# Patient Record
Sex: MALE | Race: WHITE | Employment: OTHER | URBAN - METROPOLITAN AREA
[De-identification: names, ages, dates, MRNs, and addresses within clinical notes are randomized per-mention and may not be internally consistent; named-entity substitution may affect disease eponyms.]

---

## 2021-09-21 ENCOUNTER — HOSPITAL ENCOUNTER (OUTPATIENT)
Dept: LAB | Age: 61
Discharge: HOME | End: 2021-09-21

## 2021-09-21 PROCEDURE — 88305 TISSUE EXAM BY PATHOLOGIST: CPT

## 2022-08-02 ENCOUNTER — OFFICE VISIT (OUTPATIENT)
Dept: INTERNAL MEDICINE CLINIC | Facility: CLINIC | Age: 62
End: 2022-08-02
Payer: COMMERCIAL

## 2022-08-02 VITALS
SYSTOLIC BLOOD PRESSURE: 150 MMHG | HEART RATE: 47 BPM | BODY MASS INDEX: 26.77 KG/M2 | OXYGEN SATURATION: 98 % | RESPIRATION RATE: 18 BRPM | DIASTOLIC BLOOD PRESSURE: 81 MMHG | TEMPERATURE: 97 F | HEIGHT: 70 IN | WEIGHT: 187 LBS

## 2022-08-02 DIAGNOSIS — Z00.00 ROUTINE GENERAL MEDICAL EXAMINATION AT A HEALTH CARE FACILITY: ICD-10-CM

## 2022-08-02 DIAGNOSIS — Z80.42 FAMILY HISTORY OF PROSTATE CANCER: Primary | ICD-10-CM

## 2022-08-02 DIAGNOSIS — D12.5 ADENOMATOUS POLYP OF SIGMOID COLON: ICD-10-CM

## 2022-08-02 DIAGNOSIS — R42 VERTIGO: ICD-10-CM

## 2022-08-02 DIAGNOSIS — R53.83 FATIGUE, UNSPECIFIED TYPE: ICD-10-CM

## 2022-08-02 DIAGNOSIS — E78.49 OTHER HYPERLIPIDEMIA: ICD-10-CM

## 2022-08-02 LAB
ALBUMIN SERPL-MCNC: 4.5 G/DL (ref 3.2–4.6)
ALBUMIN/GLOB SERPL: 1.4 {RATIO} (ref 1.2–3.5)
ALP SERPL-CCNC: 71 U/L (ref 50–136)
ALT SERPL-CCNC: 29 U/L (ref 12–65)
ANION GAP SERPL CALC-SCNC: 5 MMOL/L (ref 7–16)
AST SERPL-CCNC: 14 U/L (ref 15–37)
BASOPHILS # BLD: 0 K/UL (ref 0–0.2)
BASOPHILS NFR BLD: 1 % (ref 0–2)
BILIRUB SERPL-MCNC: 0.9 MG/DL (ref 0.2–1.1)
BUN SERPL-MCNC: 20 MG/DL (ref 8–23)
CALCIUM SERPL-MCNC: 10.1 MG/DL (ref 8.3–10.4)
CHLORIDE SERPL-SCNC: 109 MMOL/L (ref 98–107)
CHOLEST SERPL-MCNC: 198 MG/DL
CO2 SERPL-SCNC: 26 MMOL/L (ref 21–32)
CREAT SERPL-MCNC: 1.1 MG/DL (ref 0.8–1.5)
DIFFERENTIAL METHOD BLD: ABNORMAL
EOSINOPHIL # BLD: 0.1 K/UL (ref 0–0.8)
EOSINOPHIL NFR BLD: 2 % (ref 0.5–7.8)
ERYTHROCYTE [DISTWIDTH] IN BLOOD BY AUTOMATED COUNT: 13.2 % (ref 11.9–14.6)
GLOBULIN SER CALC-MCNC: 3.3 G/DL (ref 2.3–3.5)
GLUCOSE SERPL-MCNC: 103 MG/DL (ref 65–100)
HCT VFR BLD AUTO: 51.8 % (ref 41.1–50.3)
HDLC SERPL-MCNC: 41 MG/DL (ref 40–60)
HDLC SERPL: 4.8 {RATIO}
HGB BLD-MCNC: 16.4 G/DL (ref 13.6–17.2)
IMM GRANULOCYTES # BLD AUTO: 0 K/UL (ref 0–0.5)
IMM GRANULOCYTES NFR BLD AUTO: 1 % (ref 0–5)
LDLC SERPL CALC-MCNC: 143.8 MG/DL
LYMPHOCYTES # BLD: 1.5 K/UL (ref 0.5–4.6)
LYMPHOCYTES NFR BLD: 23 % (ref 13–44)
MCH RBC QN AUTO: 29.9 PG (ref 26.1–32.9)
MCHC RBC AUTO-ENTMCNC: 31.7 G/DL (ref 31.4–35)
MCV RBC AUTO: 94.5 FL (ref 79.6–97.8)
MONOCYTES # BLD: 0.5 K/UL (ref 0.1–1.3)
MONOCYTES NFR BLD: 8 % (ref 4–12)
NEUTS SEG # BLD: 4.3 K/UL (ref 1.7–8.2)
NEUTS SEG NFR BLD: 65 % (ref 43–78)
NRBC # BLD: 0 K/UL (ref 0–0.2)
PLATELET # BLD AUTO: 312 K/UL (ref 150–450)
PMV BLD AUTO: 10.6 FL (ref 9.4–12.3)
POTASSIUM SERPL-SCNC: 5.4 MMOL/L (ref 3.5–5.1)
PROT SERPL-MCNC: 7.8 G/DL (ref 6.3–8.2)
PSA SERPL-MCNC: 3.1 NG/ML
RBC # BLD AUTO: 5.48 M/UL (ref 4.23–5.6)
SODIUM SERPL-SCNC: 140 MMOL/L (ref 136–145)
TRIGL SERPL-MCNC: 66 MG/DL (ref 35–150)
VLDLC SERPL CALC-MCNC: 13.2 MG/DL (ref 6–23)
WBC # BLD AUTO: 6.5 K/UL (ref 4.3–11.1)

## 2022-08-02 PROCEDURE — 99396 PREV VISIT EST AGE 40-64: CPT | Performed by: INTERNAL MEDICINE

## 2022-08-02 RX ORDER — MECLIZINE HYDROCHLORIDE 25 MG/1
25 TABLET ORAL 3 TIMES DAILY PRN
Qty: 15 TABLET | Refills: 0 | Status: SHIPPED | OUTPATIENT
Start: 2022-08-02 | End: 2022-08-12

## 2022-08-02 SDOH — ECONOMIC STABILITY: FOOD INSECURITY: WITHIN THE PAST 12 MONTHS, YOU WORRIED THAT YOUR FOOD WOULD RUN OUT BEFORE YOU GOT MONEY TO BUY MORE.: NEVER TRUE

## 2022-08-02 SDOH — ECONOMIC STABILITY: FOOD INSECURITY: WITHIN THE PAST 12 MONTHS, THE FOOD YOU BOUGHT JUST DIDN'T LAST AND YOU DIDN'T HAVE MONEY TO GET MORE.: NEVER TRUE

## 2022-08-02 ASSESSMENT — PATIENT HEALTH QUESTIONNAIRE - PHQ9
2. FEELING DOWN, DEPRESSED OR HOPELESS: 0
SUM OF ALL RESPONSES TO PHQ9 QUESTIONS 1 & 2: 0
SUM OF ALL RESPONSES TO PHQ QUESTIONS 1-9: 0
1. LITTLE INTEREST OR PLEASURE IN DOING THINGS: 0

## 2022-08-02 ASSESSMENT — ENCOUNTER SYMPTOMS
ALLERGIC/IMMUNOLOGIC NEGATIVE: 1
EYES NEGATIVE: 1
RESPIRATORY NEGATIVE: 1
GASTROINTESTINAL NEGATIVE: 1

## 2022-08-02 ASSESSMENT — SOCIAL DETERMINANTS OF HEALTH (SDOH): HOW HARD IS IT FOR YOU TO PAY FOR THE VERY BASICS LIKE FOOD, HOUSING, MEDICAL CARE, AND HEATING?: NOT HARD AT ALL

## 2022-08-02 NOTE — PROGRESS NOTES
8/2/2022 9:22 AM  Location:[facility] [address] INTERNAL MEDICINE  [place]  Patient #:  [id number]  YOB: 1960          YOUR LAST HEMOGLOBIN A1CS:   No results found for: HBA1C, [id number]    YOUR LAST LIPID PROFILE:   Lab Results   Component Value Date/Time    CHOL 162 07/30/2021 10:00 AM    HDL 37 07/30/2021 10:00 AM    VLDL 14 07/30/2021 10:00 AM         Lab Results   Component Value Date/Time    GFRAA 89 07/30/2021 10:00 AM    BUN 13 07/30/2021 10:00 AM     07/30/2021 10:00 AM    K 4.8 07/30/2021 10:00 AM     07/30/2021 10:00 AM    CO2 22 07/30/2021 10:00 AM           History of Present Illness     Chief Complaint   Patient presents with    Annual Exam     Ype  90 % protein diet , eats no vegetable, takes vitamin supplements  Noted dizziness on way to [place] in am x2 and nausea  History of syncope with negative workup 14 yrs ago, saw neurology etc, possibly a seizure     This patient presents for the annual preventive exam. Patient denies any problems other than what is noted  In ROS   He has had 2 episodes of vertigo when arising from the bed to go to the bathroom. He says one was severe causing severe nausea. He relates a history of syncope approximately 14 years ago and at that time was seen by neurology and had a complete work-up that was negative. There was some consideration for some type of seizure at that time and he is on almost complete protein diet avoiding vegetables fruits. He says he feels much better since this was started. He does take supplements to provide vitamins not included in his diet    Mr. Yadi Green is a 60 y.o. male  who presents for annual preventive visit      Allergies   Allergen Reactions    Iron Other (See Comments)    Sulfa Antibiotics Other (See Comments)    Zinc Other (See Comments)     Past Medical History:   Diagnosis Date    Colon polyps 2017    Hematospermia     Irregular heartbeat 1975    Kidney stones 2002    Pneumonia 1977    had double pneumonia    Seizure [facility] 2003     Social History     Socioeconomic History    Marital status: Single     Spouse name: None    Number of children: None    Years of education: None    Highest education level: None   Tobacco Use    Smoking status: Never    Smokeless tobacco: Never   Substance and Sexual Activity    Drug use: Never     Social Determinants of Health     Financial Resource Strain: Low Risk     Difficulty of Paying Living Expenses: Not hard at all   Food Insecurity: No Food Insecurity    Worried About Running Out of Food in the Last Year: Never true    Ran Out of Food in the Last Year: Never true     Past Surgical History:   Procedure Laterality Date    HERNIA REPAIR  1961    HERNIA REPAIR       Current Outpatient Medications   Medication Sig Dispense Refill    meclizine (ANTIVERT) 25 MG tablet Take 1 tablet by mouth 3 times daily as needed for Dizziness 15 tablet 0    ascorbic acid (VITAMIN C) 500 MG tablet Take 500 mg by mouth daily      vitamin D3 (CHOLECALCIFEROL) 125 MCG (5000 UT) TABS tablet Take 5,000 Units by mouth daily      vitamin D 25 MCG (1000 UT) CAPS Take by mouth daily      cyanocobalamin 2500 MCG SUBL Take 2,500 mcg by mouth daily      pyridoxine (B-6) 100 MG tablet Take 100 mg by mouth daily       No current facility-administered medications for this visit.      Health Maintenance   Topic Date Due    COVID-19 Vaccine (1) Never done    HIV screen  Never done    Hepatitis C screen  Never done    DTaP/Tdap/Td vaccine (1 - Tdap) Never done    Shingles vaccine (2 of 2) 03/29/2021    Depression Screen  07/30/2022    Prostate Specific Antigen (PSA) Screening or Monitoring  07/30/2022    Flu vaccine (1) 09/01/2022    Lipids  07/30/2026    Colorectal Cancer Screen  09/21/2026    Hepatitis A vaccine  Aged Out    Hepatitis B vaccine  Aged Out    Hib vaccine  Aged Out    Meningococcal (ACWY) vaccine  Aged Out    Pneumococcal 0-64 years Vaccine Aged Out     Family History   Problem Relation Age of Onset    Cancer Father     Osteoarthritis Mother     Cancer Mother         breast    Hypertension Mother     Alzheimer's Disease Father     Kidney Disease Brother     Heart Disease Brother     Diabetes Brother              Review of Systems  Review of Systems   Constitutional:  Negative for fever. HENT: Negative. Eyes: Negative. Respiratory: Negative. Cardiovascular: Negative. Gastrointestinal: Negative. Endocrine: Negative. Genitourinary: Negative. Musculoskeletal: Negative. Skin: Negative. Allergic/Immunologic: Negative. Neurological: Negative. Hematological: Negative. Psychiatric/Behavioral: Negative. All other systems reviewed and are negative. BP (!) 150/81 (Site: Left Upper Arm, Position: Sitting, Cuff Size: Large Adult)   Pulse (!) 47   Temp 97 °F (36.1 °C) (Temporal)   Resp 18   Ht 5' 10\" (1.778 m)   Wt 187 lb (84.8 kg)   SpO2 98% Comment: ra  BMI 26.83 kg/m²       Physical Exam    Physical Exam  Constitutional:       General: He is not in acute distress. Appearance: Normal appearance. He is not ill-appearing. HENT:      Head: Normocephalic and atraumatic. Right Ear: Tympanic membrane and ear canal normal.      Left Ear: Tympanic membrane and ear canal normal.      Nose: Nose normal.      Mouth/Throat:      Mouth: Mucous membranes are dry. Pharynx: Oropharynx is clear. Eyes:      Extraocular Movements: Extraocular movements intact. Conjunctiva/sclera: Conjunctivae normal.      Pupils: Pupils are equal, round, and reactive to light. Cardiovascular:      Rate and Rhythm: Normal rate and regular rhythm. Pulses: Normal pulses. Heart sounds: Normal heart sounds. Pulmonary:      Effort: Pulmonary effort is normal.      Breath sounds: Normal breath sounds. Abdominal:      General: Abdomen is flat. Bowel sounds are normal. There is no distension.       Palpations: Abdomen is soft. There is no mass. Tenderness: There is no abdominal tenderness. There is no rebound. Hernia: No hernia is present. Musculoskeletal:         General: Normal range of motion. Skin:     General: Skin is warm. Neurological:      General: No focal deficit present. Mental Status: He is alert and oriented to person, place, and time. Motor: No weakness. Psychiatric:         Mood and Affect: Mood normal.         Behavior: Behavior normal.         Thought Content: Thought content normal.         Judgment: Judgment normal.       Assessment & Plan    Encounter Diagnoses   Name Primary?  Family history of prostate cancer Yes    Fatigue, unspecified type     Adenomatous polyp of sigmoid colon     Vertigo     Other hyperlipidemia        Current Outpatient Medications   Medication Sig Dispense Refill    meclizine (ANTIVERT) 25 MG tablet Take 1 tablet by mouth 3 times daily as needed for Dizziness 15 tablet 0    ascorbic acid (VITAMIN C) 500 MG tablet Take 500 mg by mouth daily      vitamin D3 (CHOLECALCIFEROL) 125 MCG (5000 UT) TABS tablet Take 5,000 Units by mouth daily      vitamin D 25 MCG (1000 UT) CAPS Take by mouth daily      cyanocobalamin 2500 MCG SUBL Take 2,500 mcg by mouth daily      pyridoxine (B-6) 100 MG tablet Take 100 mg by mouth daily       No current facility-administered medications for this visit.        Orders Placed This Encounter   Procedures    Comprehensive Metabolic Panel     Standing Status:   Future     Standing Expiration Date:   8/2/2023    CBC with Auto Differential     Standing Status:   Future     Standing Expiration Date:   8/2/2023    Lipid Panel     Standing Status:   Future     Standing Expiration Date:   8/2/2023    PSA, Diagnostic     Standing Status:   Future     Standing Expiration Date:   8/2/2023       Medications Discontinued During This Encounter   Medication Reason    naproxen (NAPROSYN) 500 MG tablet Ineffective    cyclobenzaprine (FLEXERIL) 10 MG tablet Ineffective       1. Family history of prostate cancer     - PSA, Diagnostic; Future    2. Fatigue, unspecified type     - Comprehensive Metabolic Panel; Future  - CBC with Auto Differential; Future    3. Adenomatous polyp of sigmoid colon  Up-to-date with colonoscopy    4. Vertigo  Is given some as needed meclizine and if this recurs or becomes more frequent we will refer to ENT  - meclizine (ANTIVERT) 25 MG tablet; Take 1 tablet by mouth 3 times daily as needed for Dizziness  Dispense: 15 tablet; Refill: 0    5. Other hyperlipidemia     - Lipid Panel; Future      No follow-up provider specified.         Aneta Payne MD

## 2023-03-25 SDOH — ECONOMIC STABILITY: TRANSPORTATION INSECURITY
IN THE PAST 12 MONTHS, HAS LACK OF TRANSPORTATION KEPT YOU FROM MEETINGS, WORK, OR FROM GETTING THINGS NEEDED FOR DAILY LIVING?: NO

## 2023-03-25 SDOH — ECONOMIC STABILITY: FOOD INSECURITY: WITHIN THE PAST 12 MONTHS, YOU WORRIED THAT YOUR FOOD WOULD RUN OUT BEFORE YOU GOT MONEY TO BUY MORE.: PATIENT DECLINED

## 2023-03-25 SDOH — ECONOMIC STABILITY: HOUSING INSECURITY
IN THE LAST 12 MONTHS, WAS THERE A TIME WHEN YOU DID NOT HAVE A STEADY PLACE TO SLEEP OR SLEPT IN A SHELTER (INCLUDING NOW)?: NO

## 2023-03-25 SDOH — ECONOMIC STABILITY: INCOME INSECURITY: HOW HARD IS IT FOR YOU TO PAY FOR THE VERY BASICS LIKE FOOD, HOUSING, MEDICAL CARE, AND HEATING?: PATIENT DECLINED

## 2023-03-25 SDOH — ECONOMIC STABILITY: FOOD INSECURITY: WITHIN THE PAST 12 MONTHS, THE FOOD YOU BOUGHT JUST DIDN'T LAST AND YOU DIDN'T HAVE MONEY TO GET MORE.: PATIENT DECLINED

## 2023-03-27 ENCOUNTER — OFFICE VISIT (OUTPATIENT)
Dept: INTERNAL MEDICINE CLINIC | Facility: CLINIC | Age: 63
End: 2023-03-27
Payer: COMMERCIAL

## 2023-03-27 VITALS
TEMPERATURE: 97.2 F | BODY MASS INDEX: 28.26 KG/M2 | OXYGEN SATURATION: 97 % | SYSTOLIC BLOOD PRESSURE: 155 MMHG | HEIGHT: 70 IN | DIASTOLIC BLOOD PRESSURE: 76 MMHG | RESPIRATION RATE: 18 BRPM | HEART RATE: 54 BPM | WEIGHT: 197.4 LBS

## 2023-03-27 DIAGNOSIS — D17.1 LIPOMA OF TORSO: Primary | ICD-10-CM

## 2023-03-27 DIAGNOSIS — R03.0 ELEVATED BLOOD PRESSURE READING WITHOUT DIAGNOSIS OF HYPERTENSION: ICD-10-CM

## 2023-03-27 DIAGNOSIS — R25.2 MUSCLE CRAMPING: ICD-10-CM

## 2023-03-27 LAB
ANION GAP SERPL CALC-SCNC: 3 MMOL/L (ref 2–11)
BASOPHILS # BLD: 0 K/UL (ref 0–0.2)
BASOPHILS NFR BLD: 0 % (ref 0–2)
BUN SERPL-MCNC: 18 MG/DL (ref 8–23)
CALCIUM SERPL-MCNC: 10 MG/DL (ref 8.3–10.4)
CHLORIDE SERPL-SCNC: 107 MMOL/L (ref 101–110)
CO2 SERPL-SCNC: 27 MMOL/L (ref 21–32)
CREAT SERPL-MCNC: 1 MG/DL (ref 0.8–1.5)
DIFFERENTIAL METHOD BLD: NORMAL
EOSINOPHIL # BLD: 0.1 K/UL (ref 0–0.8)
EOSINOPHIL NFR BLD: 2 % (ref 0.5–7.8)
ERYTHROCYTE [DISTWIDTH] IN BLOOD BY AUTOMATED COUNT: 13.5 % (ref 11.9–14.6)
FERRITIN SERPL-MCNC: 163 NG/ML (ref 8–388)
GLUCOSE SERPL-MCNC: 108 MG/DL (ref 65–100)
HCT VFR BLD AUTO: 47.7 % (ref 41.1–50.3)
HGB BLD-MCNC: 15.8 G/DL (ref 13.6–17.2)
IMM GRANULOCYTES # BLD AUTO: 0.1 K/UL (ref 0–0.5)
IMM GRANULOCYTES NFR BLD AUTO: 1 % (ref 0–5)
LYMPHOCYTES # BLD: 1.4 K/UL (ref 0.5–4.6)
LYMPHOCYTES NFR BLD: 19 % (ref 13–44)
MAGNESIUM SERPL-MCNC: 2.2 MG/DL (ref 1.8–2.4)
MCH RBC QN AUTO: 29.4 PG (ref 26.1–32.9)
MCHC RBC AUTO-ENTMCNC: 33.1 G/DL (ref 31.4–35)
MCV RBC AUTO: 88.8 FL (ref 82–102)
MONOCYTES # BLD: 0.7 K/UL (ref 0.1–1.3)
MONOCYTES NFR BLD: 10 % (ref 4–12)
NEUTS SEG # BLD: 5 K/UL (ref 1.7–8.2)
NEUTS SEG NFR BLD: 68 % (ref 43–78)
NRBC # BLD: 0 K/UL (ref 0–0.2)
PLATELET # BLD AUTO: 313 K/UL (ref 150–450)
PMV BLD AUTO: 10.2 FL (ref 9.4–12.3)
POTASSIUM SERPL-SCNC: 4.3 MMOL/L (ref 3.5–5.1)
RBC # BLD AUTO: 5.37 M/UL (ref 4.23–5.6)
SODIUM SERPL-SCNC: 137 MMOL/L (ref 133–143)
WBC # BLD AUTO: 7.4 K/UL (ref 4.3–11.1)

## 2023-03-27 PROCEDURE — 99213 OFFICE O/P EST LOW 20 MIN: CPT | Performed by: NURSE PRACTITIONER

## 2023-03-27 ASSESSMENT — ENCOUNTER SYMPTOMS
CHEST TIGHTNESS: 1
VOMITING: 0
CONSTIPATION: 0
COUGH: 1
ABDOMINAL PAIN: 0
WHEEZING: 0
SHORTNESS OF BREATH: 1
DIARRHEA: 0

## 2023-03-27 ASSESSMENT — PATIENT HEALTH QUESTIONNAIRE - PHQ9
SUM OF ALL RESPONSES TO PHQ QUESTIONS 1-9: 0
SUM OF ALL RESPONSES TO PHQ9 QUESTIONS 1 & 2: 0
2. FEELING DOWN, DEPRESSED OR HOPELESS: 0
SUM OF ALL RESPONSES TO PHQ QUESTIONS 1-9: 0
SUM OF ALL RESPONSES TO PHQ QUESTIONS 1-9: 0
1. LITTLE INTEREST OR PLEASURE IN DOING THINGS: 0
SUM OF ALL RESPONSES TO PHQ QUESTIONS 1-9: 0

## 2023-03-27 NOTE — PROGRESS NOTES
3/27/2023 10:04 AM  Location:[facility] [address] INTERNAL MEDICINE  [place]  Patient #:  [id number]  YOB: 1960          YOUR LAST HEMOGLOBIN A1CS:   No results found for: HBA1C, [id number]    YOUR LAST LIPID PROFILE:   Lab Results   Component Value Date/Time    CHOL 198 08/02/2022 09:48 AM    HDL 41 08/02/2022 09:48 AM    VLDL 14 07/30/2021 10:00 AM         Lab Results   Component Value Date/Time    GFRAA >60 08/02/2022 09:48 AM    BUN 20 08/02/2022 09:48 AM     08/02/2022 09:48 AM    K 5.4 08/02/2022 09:48 AM     08/02/2022 09:48 AM    CO2 26 08/02/2022 09:48 AM           History of Present Illness     Chief Complaint   Patient presents with    Follow-up     Lump on back, and wants to have his blood checked, is fasting        Mr. Jennifer Osorio is a 58 y.o. male  who presents for skin concern, muscle cramping, cough. Mr. Jennifer Osorio presents with noticing a firm palpable area that is not painful to his right back. He has noticed this for about the past month. There is no associated pain, drainage, erythema. He has not had any trauma to the area. He does have a history of rib fractures on the left side. He also reports he thinks he had COVID in January and has had some intermittent spasms in his left upper back, some spasms of his lower back and bilateral leg cramps  since having COVID. He denies leg swelling, weight gain, chest pain. Reports mild intermittent shortness of breath associated with cough and trying to take a deep breath.          Allergies   Allergen Reactions    Iron Other (See Comments)    Sulfa Antibiotics Other (See Comments)    Zinc Other (See Comments)     Past Medical History:   Diagnosis Date    Colon polyps 2017    Hematospermia     Irregular heartbeat 1975    Kidney stones 2002    Pneumonia 1977    had double pneumonia    Seizure ([facility] [address] 2003     Social History     Socioeconomic History    Marital status: Single     Spouse name: None    Number of

## 2023-03-28 ENCOUNTER — TELEPHONE (OUTPATIENT)
Dept: INTERNAL MEDICINE CLINIC | Facility: CLINIC | Age: 63
End: 2023-03-28

## 2023-03-28 DIAGNOSIS — R35.1 NOCTURIA: Primary | ICD-10-CM

## 2023-03-28 NOTE — TELEPHONE ENCOUNTER
Needs PSA scheduled. Below message sent on my chart. Mr. Anatoly Jordan,  Your PSA level is checked on an annual basis unless you are having symptoms. The last time it was checked was in August of 2022. Happy to re-order early if you would like! I went ahead and ordered so you could get this checked. The office will call and schedule. Keep me updated and I hope you are doing well!   Taniya Frankel, [address] [facility]

## 2023-04-14 ENCOUNTER — HOSPITAL ENCOUNTER (OUTPATIENT)
Dept: GENERAL RADIOLOGY | Age: 63
Discharge: HOME | End: 2023-04-17
Payer: COMMERCIAL

## 2023-04-14 ENCOUNTER — HOSPITAL ENCOUNTER (OUTPATIENT)
Dept: ULTRASOUND IMAGING | Age: 63
Discharge: HOME | End: 2023-04-17
Payer: COMMERCIAL

## 2023-04-14 DIAGNOSIS — D17.1 LIPOMA OF TORSO: ICD-10-CM

## 2023-04-14 PROCEDURE — 71101 X-RAY EXAM UNILAT RIBS/CHEST: CPT

## 2023-04-14 PROCEDURE — 76705 ECHO EXAM OF ABDOMEN: CPT

## 2023-10-24 ENCOUNTER — TELEPHONE (OUTPATIENT)
Dept: INTERNAL MEDICINE CLINIC | Facility: CLINIC | Age: 63
End: 2023-10-24

## 2023-10-24 NOTE — TELEPHONE ENCOUNTER
TRIAGE    Mr. Blue Esparza has some problems with his elbow. Has been hurting for two months. He called for an appointment for an injection . Will schedule him with Yvonne.

## 2023-10-30 ENCOUNTER — OFFICE VISIT (OUTPATIENT)
Dept: INTERNAL MEDICINE CLINIC | Facility: CLINIC | Age: 63
End: 2023-10-30
Payer: COMMERCIAL

## 2023-10-30 ENCOUNTER — TELEPHONE (OUTPATIENT)
Dept: INTERNAL MEDICINE CLINIC | Facility: CLINIC | Age: 63
End: 2023-10-30

## 2023-10-30 VITALS
DIASTOLIC BLOOD PRESSURE: 74 MMHG | BODY MASS INDEX: 28.55 KG/M2 | RESPIRATION RATE: 16 BRPM | HEART RATE: 49 BPM | SYSTOLIC BLOOD PRESSURE: 157 MMHG | TEMPERATURE: 97 F | WEIGHT: 199.4 LBS | HEIGHT: 70 IN

## 2023-10-30 DIAGNOSIS — E55.9 VITAMIN D DEFICIENCY: ICD-10-CM

## 2023-10-30 DIAGNOSIS — R53.83 FATIGUE, UNSPECIFIED TYPE: ICD-10-CM

## 2023-10-30 DIAGNOSIS — R03.0 ELEVATED BLOOD PRESSURE READING IN OFFICE WITHOUT DIAGNOSIS OF HYPERTENSION: ICD-10-CM

## 2023-10-30 DIAGNOSIS — Z12.5 SPECIAL SCREENING FOR MALIGNANT NEOPLASM OF PROSTATE: ICD-10-CM

## 2023-10-30 DIAGNOSIS — Z00.00 ROUTINE GENERAL MEDICAL EXAMINATION AT A HEALTH CARE FACILITY: ICD-10-CM

## 2023-10-30 DIAGNOSIS — R00.2 POUNDING HEARTBEAT: ICD-10-CM

## 2023-10-30 DIAGNOSIS — M77.12 LATERAL EPICONDYLITIS OF LEFT ELBOW: Primary | ICD-10-CM

## 2023-10-30 LAB
BASOPHILS # BLD: 0 K/UL (ref 0–0.2)
BASOPHILS NFR BLD: 1 % (ref 0–2)
DIFFERENTIAL METHOD BLD: NORMAL
EOSINOPHIL # BLD: 0.1 K/UL (ref 0–0.8)
EOSINOPHIL NFR BLD: 3 % (ref 0.5–7.8)
ERYTHROCYTE [DISTWIDTH] IN BLOOD BY AUTOMATED COUNT: 13 % (ref 11.9–14.6)
HCT VFR BLD AUTO: 47.7 % (ref 41.1–50.3)
HGB BLD-MCNC: 15.8 G/DL (ref 13.6–17.2)
IMM GRANULOCYTES # BLD AUTO: 0 K/UL (ref 0–0.5)
IMM GRANULOCYTES NFR BLD AUTO: 0 % (ref 0–5)
LYMPHOCYTES # BLD: 1.5 K/UL (ref 0.5–4.6)
LYMPHOCYTES NFR BLD: 26 % (ref 13–44)
MCH RBC QN AUTO: 29.9 PG (ref 26.1–32.9)
MCHC RBC AUTO-ENTMCNC: 33.1 G/DL (ref 31.4–35)
MCV RBC AUTO: 90.2 FL (ref 82–102)
MONOCYTES # BLD: 0.6 K/UL (ref 0.1–1.3)
MONOCYTES NFR BLD: 10 % (ref 4–12)
NEUTS SEG # BLD: 3.4 K/UL (ref 1.7–8.2)
NEUTS SEG NFR BLD: 60 % (ref 43–78)
NRBC # BLD: 0 K/UL (ref 0–0.2)
PLATELET # BLD AUTO: 319 K/UL (ref 150–450)
PMV BLD AUTO: 10.3 FL (ref 9.4–12.3)
RBC # BLD AUTO: 5.29 M/UL (ref 4.23–5.6)
WBC # BLD AUTO: 5.7 K/UL (ref 4.3–11.1)

## 2023-10-30 PROCEDURE — 93000 ELECTROCARDIOGRAM COMPLETE: CPT | Performed by: NURSE PRACTITIONER

## 2023-10-30 PROCEDURE — 20605 DRAIN/INJ JOINT/BURSA W/O US: CPT | Performed by: NURSE PRACTITIONER

## 2023-10-30 PROCEDURE — 99214 OFFICE O/P EST MOD 30 MIN: CPT | Performed by: NURSE PRACTITIONER

## 2023-10-30 RX ORDER — TRIAMCINOLONE ACETONIDE 40 MG/ML
20 INJECTION, SUSPENSION INTRA-ARTICULAR; INTRAMUSCULAR ONCE
Status: COMPLETED | OUTPATIENT
Start: 2023-10-30 | End: 2023-10-30

## 2023-10-30 RX ADMIN — TRIAMCINOLONE ACETONIDE 20 MG: 40 INJECTION, SUSPENSION INTRA-ARTICULAR; INTRAMUSCULAR at 09:47

## 2023-10-30 ASSESSMENT — ENCOUNTER SYMPTOMS: SHORTNESS OF BREATH: 0

## 2023-10-30 NOTE — PROGRESS NOTES
vitamin D3 (CHOLECALCIFEROL) 125 MCG (5000 UT) TABS tablet Take 1 tablet by mouth daily      cyanocobalamin 2500 MCG SUBL Take 1 tablet by mouth daily      pyridoxine (B-6) 100 MG tablet Take 1 tablet by mouth daily       No current facility-administered medications for this visit. No orders of the defined types were placed in this encounter. Medications Discontinued During This Encounter   Medication Reason    vitamin D 25 MCG (1000 UT) CAPS LIST CLEANUP       1. Lateral epicondylitis of left elbow  - DRAIN/INJECT SMALL JOINT/BURSA  Procedure note: After discussion of risks and benefits including, but not limited to pain, infection, steroid flare, increased blood sugar, fat necrosis, skin discoloration, and injury to blood vessels or nerves, the patient verbally consented to proceed with an elbow injection. Procedure:  left  elbow arthrocentesis performed, aseptic technique used, site cleansed with betadine, Triamcinalone 20mg and Lidocaine 1% 1mL injected. With the elbow flexed, 25ga needle was inserted distal to the lateral epicondyle at the point of maximum tenderness. Aspiration of needle to ensure that there was no needle placement in the blood vessel. The solution was injected slowly. Distal n/v intact pre and post-procedure. Pt. tolerated procedure well. Pt. was instructed to apply ice to joints for 15 minutes 3 x daily, and rest the joints. Adivsed pt. to call office with any redness, swelling, warmth, or increased pain to area. Adivsed pt. to call office in next few days if symptoms are not improving. Will call and check on him in several days. Discussed strengthening exercises, provided today, will continue with counter force brace with activity. Refer if pain persists. - triamcinolone acetonide (KENALOG-40) injection 20 mg    2. Special screening for malignant neoplasm of prostate  - PSA Screening; Future  - PSA Screening    3.  Elevated blood pressure reading in office without

## 2023-10-30 NOTE — TELEPHONE ENCOUNTER
Patient was in office wants to know if he needs to keep his physical stated he got all his blood work done today during his visit. He is not on any medications. Wants to know if he needs to come back for appointment in December?

## 2023-10-31 ENCOUNTER — TELEPHONE (OUTPATIENT)
Dept: INTERNAL MEDICINE CLINIC | Facility: CLINIC | Age: 63
End: 2023-10-31

## 2023-10-31 LAB
25(OH)D3 SERPL-MCNC: 96.3 NG/ML (ref 30–100)
ALBUMIN SERPL-MCNC: 4.5 G/DL (ref 3.2–4.6)
ALBUMIN/GLOB SERPL: 1.6 (ref 0.4–1.6)
ALP SERPL-CCNC: 58 U/L (ref 50–136)
ALT SERPL-CCNC: 41 U/L (ref 12–65)
ANION GAP SERPL CALC-SCNC: 7 MMOL/L (ref 2–11)
AST SERPL-CCNC: 20 U/L (ref 15–37)
BILIRUB SERPL-MCNC: 0.8 MG/DL (ref 0.2–1.1)
BUN SERPL-MCNC: 22 MG/DL (ref 8–23)
CALCIUM SERPL-MCNC: 9.6 MG/DL (ref 8.3–10.4)
CHLORIDE SERPL-SCNC: 110 MMOL/L (ref 101–110)
CHOLEST SERPL-MCNC: 203 MG/DL
CO2 SERPL-SCNC: 24 MMOL/L (ref 21–32)
CREAT SERPL-MCNC: 1 MG/DL (ref 0.8–1.5)
GLOBULIN SER CALC-MCNC: 2.8 G/DL (ref 2.8–4.5)
GLUCOSE SERPL-MCNC: 101 MG/DL (ref 65–100)
HDLC SERPL-MCNC: 40 MG/DL (ref 40–60)
HDLC SERPL: 5.1
LDLC SERPL CALC-MCNC: 147.2 MG/DL
POTASSIUM SERPL-SCNC: 4.7 MMOL/L (ref 3.5–5.1)
PROT SERPL-MCNC: 7.3 G/DL (ref 6.3–8.2)
PSA SERPL-MCNC: 3.3 NG/ML
SODIUM SERPL-SCNC: 141 MMOL/L (ref 133–143)
TRIGL SERPL-MCNC: 79 MG/DL (ref 35–150)
TSH W FREE THYROID IF ABNORMAL: 0.63 UIU/ML (ref 0.36–3.74)
VLDLC SERPL CALC-MCNC: 15.8 MG/DL (ref 6–23)

## 2024-01-16 ENCOUNTER — TELEPHONE (OUTPATIENT)
Dept: INTERNAL MEDICINE CLINIC | Facility: CLINIC | Age: 64
End: 2024-01-16

## 2024-01-16 NOTE — TELEPHONE ENCOUNTER
----- Message from Meghan Gallardo sent at 1/16/2024  9:20 AM EST -----  Subject: Message to Provider    QUESTIONS  Information for Provider? Patient needs to schedule a follow up visit to   get removal of sutures above his left eye on 1/25. Please reach out to   patient to schedule.  ---------------------------------------------------------------------------  --------------  CALL BACK INFO  [phone]; OK to leave message on voicemail  ---------------------------------------------------------------------------  --------------  SCRIPT ANSWERS  Relationship to Patient? Self  (Is the patient requesting to see the provider for a procedure?)? Yes

## 2024-01-22 ASSESSMENT — PATIENT HEALTH QUESTIONNAIRE - PHQ9
1. LITTLE INTEREST OR PLEASURE IN DOING THINGS: NOT AT ALL
2. FEELING DOWN, DEPRESSED OR HOPELESS: 0
SUM OF ALL RESPONSES TO PHQ QUESTIONS 1-9: 0
1. LITTLE INTEREST OR PLEASURE IN DOING THINGS: 0
SUM OF ALL RESPONSES TO PHQ9 QUESTIONS 1 & 2: 0
2. FEELING DOWN, DEPRESSED OR HOPELESS: NOT AT ALL
SUM OF ALL RESPONSES TO PHQ9 QUESTIONS 1 & 2: 0
SUM OF ALL RESPONSES TO PHQ QUESTIONS 1-9: 0

## 2024-01-25 ENCOUNTER — OFFICE VISIT (OUTPATIENT)
Dept: INTERNAL MEDICINE CLINIC | Facility: CLINIC | Age: 64
End: 2024-01-25

## 2024-01-25 VITALS
HEIGHT: 70 IN | SYSTOLIC BLOOD PRESSURE: 153 MMHG | OXYGEN SATURATION: 97 % | TEMPERATURE: 97.5 F | DIASTOLIC BLOOD PRESSURE: 83 MMHG | HEART RATE: 56 BPM | BODY MASS INDEX: 29.35 KG/M2 | WEIGHT: 205 LBS

## 2024-01-25 DIAGNOSIS — Z48.02 VISIT FOR SUTURE REMOVAL: Primary | ICD-10-CM

## 2024-01-25 DIAGNOSIS — S01.81XD LACERATION OF FOREHEAD, SUBSEQUENT ENCOUNTER: ICD-10-CM

## 2024-01-25 ASSESSMENT — ENCOUNTER SYMPTOMS
NAUSEA: 0
COUGH: 0
SHORTNESS OF BREATH: 0
CHEST TIGHTNESS: 0
DIARRHEA: 0
CONSTIPATION: 0
VOMITING: 0

## 2024-01-25 NOTE — PROGRESS NOTES
Musculoskeletal:         General: No signs of injury. Normal range of motion.   Skin:     General: Skin is warm and dry.      Findings: Laceration (well approximated- sutures removed) present.          Neurological:      General: No focal deficit present.      Mental Status: He is alert and oriented to person, place, and time.   Psychiatric:         Mood and Affect: Mood normal.         Behavior: Behavior normal.              Plan:     1. Visit for suture removal  Sutures removed this visit w/o complication    2. Laceration of forehead, subsequent encounter  Sutures removed  Education provided on skin care.       Follow up:  Return if symptoms worsen or fail to improve, for with Dr. Díaz as scheduled..       Signed By: ALICIA Santos - NP     January 25, 2024

## 2024-08-13 ENCOUNTER — TELEPHONE (OUTPATIENT)
Dept: INTERNAL MEDICINE CLINIC | Facility: CLINIC | Age: 64
End: 2024-08-13

## 2024-08-13 NOTE — TELEPHONE ENCOUNTER
Patient has concerns with taking amoxicillin 500 three times a day and wanted to know it this will effect his PSA blood tomorrow please advise

## 2024-08-13 NOTE — TELEPHONE ENCOUNTER
Patient called back to see if  taking the medication would affect his labs tomorrow morning 08/13/2024.

## 2024-08-14 ENCOUNTER — OFFICE VISIT (OUTPATIENT)
Dept: INTERNAL MEDICINE CLINIC | Facility: CLINIC | Age: 64
End: 2024-08-14
Payer: MEDICARE

## 2024-08-14 ENCOUNTER — TELEPHONE (OUTPATIENT)
Dept: INTERNAL MEDICINE CLINIC | Facility: CLINIC | Age: 64
End: 2024-08-14

## 2024-08-14 VITALS
DIASTOLIC BLOOD PRESSURE: 74 MMHG | TEMPERATURE: 97.1 F | HEART RATE: 46 BPM | SYSTOLIC BLOOD PRESSURE: 160 MMHG | WEIGHT: 201.4 LBS | RESPIRATION RATE: 18 BRPM | OXYGEN SATURATION: 98 % | BODY MASS INDEX: 28.83 KG/M2 | HEIGHT: 70 IN

## 2024-08-14 DIAGNOSIS — R73.09 ELEVATED GLUCOSE: Primary | ICD-10-CM

## 2024-08-14 DIAGNOSIS — E55.9 VITAMIN D DEFICIENCY: ICD-10-CM

## 2024-08-14 DIAGNOSIS — Z12.5 SPECIAL SCREENING FOR MALIGNANT NEOPLASM OF PROSTATE: ICD-10-CM

## 2024-08-14 DIAGNOSIS — Z00.00 EXAMINATION, MEDICAL, GENERAL: ICD-10-CM

## 2024-08-14 DIAGNOSIS — R68.84 JAW PAIN: Primary | ICD-10-CM

## 2024-08-14 LAB
25(OH)D3 SERPL-MCNC: 56.1 NG/ML (ref 30–100)
ALBUMIN SERPL-MCNC: 4.3 G/DL (ref 3.2–4.6)
ALBUMIN/GLOB SERPL: 1.6 (ref 1–1.9)
ALP SERPL-CCNC: 70 U/L (ref 40–129)
ALT SERPL-CCNC: 29 U/L (ref 12–65)
ANION GAP SERPL CALC-SCNC: 11 MMOL/L (ref 9–18)
AST SERPL-CCNC: 23 U/L (ref 15–37)
BASOPHILS # BLD: 0 K/UL (ref 0–0.2)
BASOPHILS NFR BLD: 1 % (ref 0–2)
BILIRUB SERPL-MCNC: 0.6 MG/DL (ref 0–1.2)
BUN SERPL-MCNC: 14 MG/DL (ref 8–23)
CALCIUM SERPL-MCNC: 9.4 MG/DL (ref 8.8–10.2)
CHLORIDE SERPL-SCNC: 106 MMOL/L (ref 98–107)
CHOLEST SERPL-MCNC: 176 MG/DL (ref 0–200)
CO2 SERPL-SCNC: 23 MMOL/L (ref 20–28)
CREAT SERPL-MCNC: 1 MG/DL (ref 0.8–1.3)
DIFFERENTIAL METHOD BLD: NORMAL
EOSINOPHIL # BLD: 0.1 K/UL (ref 0–0.8)
EOSINOPHIL NFR BLD: 3 % (ref 0.5–7.8)
ERYTHROCYTE [DISTWIDTH] IN BLOOD BY AUTOMATED COUNT: 13.3 % (ref 11.9–14.6)
GLOBULIN SER CALC-MCNC: 2.7 G/DL (ref 2.3–3.5)
GLUCOSE SERPL-MCNC: 110 MG/DL (ref 70–99)
HCT VFR BLD AUTO: 47.4 % (ref 41.1–50.3)
HDLC SERPL-MCNC: 36 MG/DL (ref 40–60)
HDLC SERPL: 5 (ref 0–5)
HGB BLD-MCNC: 15.8 G/DL (ref 13.6–17.2)
IMM GRANULOCYTES # BLD AUTO: 0 K/UL (ref 0–0.5)
IMM GRANULOCYTES NFR BLD AUTO: 0 % (ref 0–5)
LDLC SERPL CALC-MCNC: 126 MG/DL (ref 0–100)
LYMPHOCYTES # BLD: 1.5 K/UL (ref 0.5–4.6)
LYMPHOCYTES NFR BLD: 30 % (ref 13–44)
MCH RBC QN AUTO: 30 PG (ref 26.1–32.9)
MCHC RBC AUTO-ENTMCNC: 33.3 G/DL (ref 31.4–35)
MCV RBC AUTO: 89.9 FL (ref 82–102)
MONOCYTES # BLD: 0.4 K/UL (ref 0.1–1.3)
MONOCYTES NFR BLD: 9 % (ref 4–12)
NEUTS SEG # BLD: 2.8 K/UL (ref 1.7–8.2)
NEUTS SEG NFR BLD: 57 % (ref 43–78)
NRBC # BLD: 0 K/UL (ref 0–0.2)
PLATELET # BLD AUTO: 320 K/UL (ref 150–450)
PMV BLD AUTO: 10.2 FL (ref 9.4–12.3)
POTASSIUM SERPL-SCNC: 4.6 MMOL/L (ref 3.5–5.1)
PROT SERPL-MCNC: 7 G/DL (ref 6.3–8.2)
PSA SERPL-MCNC: 2.5 NG/ML (ref 0–4)
RBC # BLD AUTO: 5.27 M/UL (ref 4.23–5.6)
SODIUM SERPL-SCNC: 141 MMOL/L (ref 136–145)
TRIGL SERPL-MCNC: 74 MG/DL (ref 0–150)
TSH W FREE THYROID IF ABNORMAL: 0.66 UIU/ML (ref 0.27–4.2)
VLDLC SERPL CALC-MCNC: 15 MG/DL (ref 6–23)
WBC # BLD AUTO: 4.9 K/UL (ref 4.3–11.1)

## 2024-08-14 PROCEDURE — 1123F ACP DISCUSS/DSCN MKR DOCD: CPT | Performed by: NURSE PRACTITIONER

## 2024-08-14 PROCEDURE — 99214 OFFICE O/P EST MOD 30 MIN: CPT | Performed by: NURSE PRACTITIONER

## 2024-08-14 RX ORDER — ASCORBIC ACID 125 MG
TABLET,CHEWABLE ORAL
COMMUNITY
Start: 2022-01-22

## 2024-08-14 RX ORDER — AMOXICILLIN 500 MG/1
500 CAPSULE ORAL 3 TIMES DAILY
COMMUNITY

## 2024-08-14 SDOH — ECONOMIC STABILITY: FOOD INSECURITY: WITHIN THE PAST 12 MONTHS, YOU WORRIED THAT YOUR FOOD WOULD RUN OUT BEFORE YOU GOT MONEY TO BUY MORE.: NEVER TRUE

## 2024-08-14 SDOH — ECONOMIC STABILITY: FOOD INSECURITY: WITHIN THE PAST 12 MONTHS, THE FOOD YOU BOUGHT JUST DIDN'T LAST AND YOU DIDN'T HAVE MONEY TO GET MORE.: NEVER TRUE

## 2024-08-14 SDOH — ECONOMIC STABILITY: INCOME INSECURITY: HOW HARD IS IT FOR YOU TO PAY FOR THE VERY BASICS LIKE FOOD, HOUSING, MEDICAL CARE, AND HEATING?: NOT HARD AT ALL

## 2024-08-14 ASSESSMENT — ENCOUNTER SYMPTOMS
VOICE CHANGE: 0
VOMITING: 0
NAUSEA: 0
SHORTNESS OF BREATH: 0
FACIAL SWELLING: 0
SINUS PRESSURE: 0
SINUS PAIN: 0
TROUBLE SWALLOWING: 0
SORE THROAT: 0
RHINORRHEA: 0

## 2024-08-14 NOTE — TELEPHONE ENCOUNTER
Message sent on my chart. Can we add A1c to labs done yesterday?  I ordered.   Thanks so much!  Ellyn

## 2024-08-14 NOTE — PROGRESS NOTES
8/14/2024 9:27 AM  Location:[facility] PHYSICIAN SERVICES  [facility] [place] [place]  [place]  Patient #:  [id number]  YOB: 1960          YOUR LAST HEMOGLOBIN A1CS:   No results found for: \"HBA1C\", \"AIN2QUHQ\"    YOUR LAST LIPID PROFILE:   Lab Results   Component Value Date/Time    CHOL 203 10/30/2023 09:08 AM    HDL 40 10/30/2023 09:08 AM    .2 10/30/2023 09:08 AM    VLDL 15.8 10/30/2023 09:08 AM    VLDL 14 07/30/2021 10:00 AM         Lab Results   Component Value Date/Time    GFRAA >60 08/02/2022 09:48 AM    BUN 22 10/30/2023 09:08 AM     10/30/2023 09:08 AM    K 4.7 10/30/2023 09:08 AM     10/30/2023 09:08 AM    CO2 24 10/30/2023 09:08 AM           History of Present Illness     Chief Complaint   Patient presents with    Jaw Pain     Patient presents in the office today c/o L sided jaw pain radiating to neck, had dental work done yesterday.        Mr. Abdalla is a 64 y.o. male  who presents for the above mentioned complaints.  Mr. Abdalla presents with jaw pain.  His history is significant for polyps, he does have a family history of prostate cancer.  Reports he had some dental work yesterday.  Would like his prostate levels rechecked with FH of prostate cancer. Reports no obstructive flow, does have frequent urination.   Is due for labs and general check up. Stays active working outside in his yard. Knows he needs to work out more.     Reports that he had a bone graft in left jaw in December. Has had pain since. Had repeat surgery in March and had a hole in his gum. Went to Dr. Henry and had this fixed several weeks ago. Has been on Amoxicillin since.   Had his teeth cleaned 10 days ago and determined he had an abscess and had a root canal Monday to right lower teeth. Will stop Amoxiciillin tomorrow and go on Z-pack next for infection. Sees [place] endodontist for this.   Still has residual pain to left jaw from his previous surgery.   After his root canal Monday his

## 2024-08-15 DIAGNOSIS — R73.09 ELEVATED GLUCOSE: ICD-10-CM

## 2024-08-15 LAB
EST. AVERAGE GLUCOSE BLD GHB EST-MCNC: 112 MG/DL
HBA1C MFR BLD: 5.5 % (ref 0–5.6)

## 2024-08-16 DIAGNOSIS — R68.84 JAW PAIN: Primary | ICD-10-CM

## 2024-08-16 RX ORDER — CYCLOBENZAPRINE HCL 10 MG
10 TABLET ORAL 3 TIMES DAILY PRN
Qty: 30 TABLET | Refills: 1 | Status: SHIPPED | OUTPATIENT
Start: 2024-08-16 | End: 2024-09-05

## 2025-01-14 ENCOUNTER — TELEPHONE (OUTPATIENT)
Dept: INTERNAL MEDICINE CLINIC | Facility: CLINIC | Age: 65
End: 2025-01-14

## 2025-01-14 NOTE — TELEPHONE ENCOUNTER
I saw where this patient called in with nose bleeds and has had to have his nose bleeding cauterized before.   Let him know that we cannot do that here in the office and that if he is having uncontrolled nose bleeds he should go to the ER where they can cauterize or pack it.   Otherwise, I am happy to see him, just wanted to make sure he knew we can't do that and not to wait if needed.  Thanks!  Ellyn

## 2025-01-15 NOTE — TELEPHONE ENCOUNTER
Patient informed and verbalized understanding.  Patient states he has only had two nosebleeds, and would still like to come in to be evaluated, and if he does need to have it cauterized then he would find a specialist to see.

## 2025-01-16 ENCOUNTER — OFFICE VISIT (OUTPATIENT)
Dept: INTERNAL MEDICINE CLINIC | Facility: CLINIC | Age: 65
End: 2025-01-16

## 2025-01-16 VITALS
OXYGEN SATURATION: 99 % | TEMPERATURE: 97.5 F | HEIGHT: 70 IN | HEART RATE: 51 BPM | RESPIRATION RATE: 18 BRPM | WEIGHT: 204.8 LBS | DIASTOLIC BLOOD PRESSURE: 88 MMHG | BODY MASS INDEX: 29.32 KG/M2 | SYSTOLIC BLOOD PRESSURE: 155 MMHG

## 2025-01-16 DIAGNOSIS — I10 PRIMARY HYPERTENSION: ICD-10-CM

## 2025-01-16 DIAGNOSIS — J34.89 SINUS PRESSURE: ICD-10-CM

## 2025-01-16 DIAGNOSIS — Z87.898 HISTORY OF EPISTAXIS: Primary | ICD-10-CM

## 2025-01-16 RX ORDER — METHYLPREDNISOLONE 4 MG/1
TABLET ORAL
Qty: 1 KIT | Refills: 0 | Status: SHIPPED | OUTPATIENT
Start: 2025-01-16 | End: 2025-01-22

## 2025-01-16 RX ORDER — LOSARTAN POTASSIUM 25 MG/1
25 TABLET ORAL DAILY
Qty: 90 TABLET | Refills: 1 | Status: SHIPPED | OUTPATIENT
Start: 2025-01-16

## 2025-01-16 SDOH — ECONOMIC STABILITY: FOOD INSECURITY: WITHIN THE PAST 12 MONTHS, THE FOOD YOU BOUGHT JUST DIDN'T LAST AND YOU DIDN'T HAVE MONEY TO GET MORE.: NEVER TRUE

## 2025-01-16 SDOH — ECONOMIC STABILITY: FOOD INSECURITY: WITHIN THE PAST 12 MONTHS, YOU WORRIED THAT YOUR FOOD WOULD RUN OUT BEFORE YOU GOT MONEY TO BUY MORE.: NEVER TRUE

## 2025-01-16 ASSESSMENT — ENCOUNTER SYMPTOMS
WHEEZING: 0
SINUS PRESSURE: 1
SHORTNESS OF BREATH: 0
SORE THROAT: 0
TROUBLE SWALLOWING: 0

## 2025-01-16 ASSESSMENT — PATIENT HEALTH QUESTIONNAIRE - PHQ9
2. FEELING DOWN, DEPRESSED OR HOPELESS: NOT AT ALL
SUM OF ALL RESPONSES TO PHQ9 QUESTIONS 1 & 2: 0
SUM OF ALL RESPONSES TO PHQ QUESTIONS 1-9: 0
1. LITTLE INTEREST OR PLEASURE IN DOING THINGS: NOT AT ALL
SUM OF ALL RESPONSES TO PHQ QUESTIONS 1-9: 0
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
## 2025-04-22 ENCOUNTER — OFFICE VISIT (OUTPATIENT)
Dept: INTERNAL MEDICINE CLINIC | Facility: CLINIC | Age: 65
End: 2025-04-22
Payer: MEDICARE

## 2025-04-22 VITALS
SYSTOLIC BLOOD PRESSURE: 133 MMHG | BODY MASS INDEX: 29.01 KG/M2 | TEMPERATURE: 97.3 F | HEART RATE: 51 BPM | WEIGHT: 202.6 LBS | RESPIRATION RATE: 16 BRPM | DIASTOLIC BLOOD PRESSURE: 78 MMHG | HEIGHT: 70 IN

## 2025-04-22 DIAGNOSIS — N40.0 BENIGN PROSTATIC HYPERPLASIA WITHOUT LOWER URINARY TRACT SYMPTOMS: ICD-10-CM

## 2025-04-22 DIAGNOSIS — D22.4 ATYPICAL NEVUS OF SCALP: ICD-10-CM

## 2025-04-22 DIAGNOSIS — L57.0 ACTINIC KERATOSIS: ICD-10-CM

## 2025-04-22 DIAGNOSIS — I10 PRIMARY HYPERTENSION: ICD-10-CM

## 2025-04-22 DIAGNOSIS — D12.5 ADENOMATOUS POLYP OF SIGMOID COLON: Primary | ICD-10-CM

## 2025-04-22 DIAGNOSIS — N52.9 ERECTILE DYSFUNCTION, UNSPECIFIED ERECTILE DYSFUNCTION TYPE: ICD-10-CM

## 2025-04-22 DIAGNOSIS — R73.09 ELEVATED GLUCOSE: ICD-10-CM

## 2025-04-22 DIAGNOSIS — Z00.00 INITIAL MEDICARE ANNUAL WELLNESS VISIT: ICD-10-CM

## 2025-04-22 DIAGNOSIS — Z80.42 FAMILY HISTORY OF PROSTATE CANCER: ICD-10-CM

## 2025-04-22 LAB
ALT SERPL-CCNC: 28 U/L (ref 8–55)
ANION GAP SERPL CALC-SCNC: 12 MMOL/L (ref 7–16)
BUN SERPL-MCNC: 13 MG/DL (ref 8–23)
CALCIUM SERPL-MCNC: 9.9 MG/DL (ref 8.8–10.2)
CHLORIDE SERPL-SCNC: 107 MMOL/L (ref 98–107)
CHOLEST SERPL-MCNC: 191 MG/DL (ref 0–200)
CO2 SERPL-SCNC: 22 MMOL/L (ref 20–29)
CREAT SERPL-MCNC: 0.91 MG/DL (ref 0.8–1.3)
GLUCOSE SERPL-MCNC: 89 MG/DL (ref 70–99)
HDLC SERPL-MCNC: 35 MG/DL (ref 40–60)
HDLC SERPL: 5.5 (ref 0–5)
LDLC SERPL CALC-MCNC: 138 MG/DL (ref 0–100)
POTASSIUM SERPL-SCNC: 4.3 MMOL/L (ref 3.5–5.1)
PSA SERPL-MCNC: 3 NG/ML (ref 0–4)
SODIUM SERPL-SCNC: 141 MMOL/L (ref 136–145)
TRIGL SERPL-MCNC: 91 MG/DL (ref 0–150)
VLDLC SERPL CALC-MCNC: 18 MG/DL (ref 6–23)

## 2025-04-22 PROCEDURE — 1123F ACP DISCUSS/DSCN MKR DOCD: CPT | Performed by: INTERNAL MEDICINE

## 2025-04-22 PROCEDURE — G0438 PPPS, INITIAL VISIT: HCPCS | Performed by: INTERNAL MEDICINE

## 2025-04-22 PROCEDURE — 99214 OFFICE O/P EST MOD 30 MIN: CPT | Performed by: INTERNAL MEDICINE

## 2025-04-22 PROCEDURE — 17000 DESTRUCT PREMALG LESION: CPT | Performed by: INTERNAL MEDICINE

## 2025-04-22 PROCEDURE — 3075F SYST BP GE 130 - 139MM HG: CPT | Performed by: INTERNAL MEDICINE

## 2025-04-22 PROCEDURE — 1160F RVW MEDS BY RX/DR IN RCRD: CPT | Performed by: INTERNAL MEDICINE

## 2025-04-22 PROCEDURE — 1159F MED LIST DOCD IN RCRD: CPT | Performed by: INTERNAL MEDICINE

## 2025-04-22 PROCEDURE — 1126F AMNT PAIN NOTED NONE PRSNT: CPT | Performed by: INTERNAL MEDICINE

## 2025-04-22 PROCEDURE — 3078F DIAST BP <80 MM HG: CPT | Performed by: INTERNAL MEDICINE

## 2025-04-22 PROCEDURE — G2211 COMPLEX E/M VISIT ADD ON: HCPCS | Performed by: INTERNAL MEDICINE

## 2025-04-22 RX ORDER — OLIVE OIL
OIL (ML) MISCELLANEOUS
COMMUNITY

## 2025-04-22 SDOH — HEALTH STABILITY: PHYSICAL HEALTH: ON AVERAGE, HOW MANY DAYS PER WEEK DO YOU ENGAGE IN MODERATE TO STRENUOUS EXERCISE (LIKE A BRISK WALK)?: 5 DAYS

## 2025-04-22 SDOH — HEALTH STABILITY: PHYSICAL HEALTH: ON AVERAGE, HOW MANY MINUTES DO YOU ENGAGE IN EXERCISE AT THIS LEVEL?: PATIENT DECLINED

## 2025-04-22 ASSESSMENT — LIFESTYLE VARIABLES
HOW OFTEN DO YOU HAVE A DRINK CONTAINING ALCOHOL: NEVER
HOW MANY STANDARD DRINKS CONTAINING ALCOHOL DO YOU HAVE ON A TYPICAL DAY: PATIENT DOES NOT DRINK
HOW OFTEN DO YOU HAVE SIX OR MORE DRINKS ON ONE OCCASION: 1
HOW MANY STANDARD DRINKS CONTAINING ALCOHOL DO YOU HAVE ON A TYPICAL DAY: 0
HOW OFTEN DO YOU HAVE A DRINK CONTAINING ALCOHOL: 1

## 2025-04-22 ASSESSMENT — PATIENT HEALTH QUESTIONNAIRE - PHQ9
1. LITTLE INTEREST OR PLEASURE IN DOING THINGS: NOT AT ALL
SUM OF ALL RESPONSES TO PHQ QUESTIONS 1-9: 0
2. FEELING DOWN, DEPRESSED OR HOPELESS: NOT AT ALL
SUM OF ALL RESPONSES TO PHQ QUESTIONS 1-9: 0

## 2025-04-22 NOTE — PROGRESS NOTES
4/22/2025 4:19 PM  Location:[facility] PHYSICIAN SERVICES  [facility] [place]INE  [place]  Patient #:  [id number]  YOB: 1960      History of Present Illness  65-year-old male for annual preventive screen and wellness exam.    Hypertension   Diagnosed with hypertension, not started losartan. Episode of epistaxis attributed to BP readings around 170. BP improved to 133/78 last night and 150/77 today despite no weight loss. High intake of sugar, carbs, and salt; unhealthy food choices. Active lifestyle: golf, yard work, house cleaning. Discontinued supplements, started 2 tbsp cold-pressed olive oil daily, possibly improving BP.  - Onset: Episode of epistaxis with BP readings around 170.  - Location: Blood pressure readings.  - Duration: BP improved to 133/78 last night and 150/77 today.  - Character: High intake of sugar, carbs, and salt; unhealthy food choices.  - Alleviating/Aggravating Factors: Discontinued supplements, started 2 tbsp cold-pressed olive oil daily.  - Severity: BP readings around 170, improved to 133/78 last night and 150/77 today.    No chest pain or shortness of breath. Mild cough due to pollen exposure. Regular bowel movements, consumes coffee in winter. Fasting since yesterday for blood work.    Family history of prostate cancer in both brothers.    Lump on Abdomen  Lump on abdomen feels like skin, not consulted dermatologist.  - Character: Feels like skin.    Dark Lesion on Scalp  Dark lesion on scalp, feels larger, not seen dermatologist.  - Character: Feels larger.    Pressure in Right Eye  Pressure in right eye, no glaucoma, needs monitoring.  - Character: Pressure in right eye.  - Severity: No glaucoma, needs monitoring.    SOCIAL HISTORY  He smoked for about 2 years right out of college and then quit.    FAMILY HISTORY  Both of his brothers had prostate cancer.    Allergies   Allergen Reactions    Iron Other (See Comments)    Zinc Other (See Comments)    Sulfa

## 2025-04-22 NOTE — PATIENT INSTRUCTIONS
quitting for good. Quitting is one of the most important things you can do to protect your heart. It is never too late to quit. Try to avoid secondhand smoke too.     Stay at a weight that's healthy for you. Talk to your doctor if you need help losing weight.     Try to get 7 to 9 hours of sleep each night.     Limit alcohol to 2 drinks a day for men and 1 drink a day for women. Too much alcohol can cause health problems.     Manage other health problems such as diabetes, high blood pressure, and high cholesterol. If you think you may have a problem with alcohol or drug use, talk to your doctor.   Medicines    Take your medicines exactly as prescribed. Call your doctor if you think you are having a problem with your medicine.     If your doctor recommends aspirin, take the amount directed each day. Make sure you take aspirin and not another kind of pain reliever, such as acetaminophen (Tylenol).   When should you call for help?   Call 911 if you have symptoms of a heart attack. These may include:    Chest pain or pressure, or a strange feeling in the chest.     Sweating.     Shortness of breath.     Pain, pressure, or a strange feeling in the back, neck, jaw, or upper belly or in one or both shoulders or arms.     Lightheadedness or sudden weakness.     A fast or irregular heartbeat.   After you call 911, the  may tell you to chew 1 adult-strength or 2 to 4 low-dose aspirin. Wait for an ambulance. Do not try to drive yourself.  Watch closely for changes in your health, and be sure to contact your doctor if you have any problems.  Where can you learn more?  Go to https://www.[employer].net/patientEd and enter F075 to learn more about \"A Healthy Heart: Care Instructions.\"  Current as of: July 31, 2024  Content Version: 14.4  © 6940-2914 [employer].   Care instructions adapted under license by [employer]. If you have questions about a medical condition or this instruction, always ask your

## 2025-04-24 ENCOUNTER — RESULTS FOLLOW-UP (OUTPATIENT)
Dept: INTERNAL MEDICINE CLINIC | Facility: CLINIC | Age: 65
End: 2025-04-24

## 2025-04-28 ENCOUNTER — PATIENT MESSAGE (OUTPATIENT)
Dept: INTERNAL MEDICINE CLINIC | Facility: CLINIC | Age: 65
End: 2025-04-28

## 2025-05-12 ENCOUNTER — OFFICE VISIT (OUTPATIENT)
Dept: ORTHOPEDIC SURGERY | Age: 65
End: 2025-05-12
Payer: MEDICARE

## 2025-05-12 DIAGNOSIS — S93.412A SPRAIN OF CALCANEOFIBULAR LIGAMENT OF LEFT ANKLE, INITIAL ENCOUNTER: ICD-10-CM

## 2025-05-12 DIAGNOSIS — M79.671 BILATERAL FOOT PAIN: Primary | ICD-10-CM

## 2025-05-12 DIAGNOSIS — S90.31XA CONTUSION OF RIGHT FOOT, INITIAL ENCOUNTER: ICD-10-CM

## 2025-05-12 DIAGNOSIS — M79.672 BILATERAL FOOT PAIN: Primary | ICD-10-CM

## 2025-05-12 PROCEDURE — 1123F ACP DISCUSS/DSCN MKR DOCD: CPT | Performed by: ORTHOPAEDIC SURGERY

## 2025-05-12 PROCEDURE — 99204 OFFICE O/P NEW MOD 45 MIN: CPT | Performed by: ORTHOPAEDIC SURGERY

## 2025-05-12 RX ORDER — METHYLPREDNISOLONE 4 MG/1
TABLET ORAL
Qty: 1 KIT | Refills: 1 | Status: SHIPPED | OUTPATIENT
Start: 2025-05-12 | End: 2025-05-18

## 2025-05-12 NOTE — PROGRESS NOTES
Name: Zackary Abdalla  YOB: 1960  Gender: male  MRN: [id number]    Summary:     Right foot contusion, left lateral ankle sprain     CC: New Patient (Bilateral feet/ankle xrays obtained in office today )       HPI: Zackary Abdalla is a 65 y.o. male who presents with New Patient (Bilateral feet/ankle xrays obtained in office today )  .  This patient presents to the office after injury sustained back at the end of April to his left ankle first and then rolled over his right foot with his knee scooter.  He has been in a walker boot and crutches since that time.    History was obtained by Patient     ROS/Meds/PSH/PMH/FH/SH: I personally reviewed the patients standard intake form.  Below are the pertinents    Tobacco:  reports that he has never smoked. He has never used smokeless tobacco.  Diabetes: None      Physical Examination:  Exam of the right lower extremity shows some swelling and tenderness to palpation over the second toe and the medial aspect of the first MTP joint.  There is no pain in the midfoot or the ankle on the side.  The left side shows tenderness palpation of the distal fibula area.  There is no tenderness in the syndesmosis.  There is no pain with external rotation and has a negative squeeze test.  He has palpable pulses and intact sensation.      Imaging:   Interpretation of imaging  Bilateral feet ankle XR: AP, Lateral, Oblique views     ICD-10-CM    1. Bilateral foot pain  M79.671 XR ANKLE STANDARD BILATERAL    M79.672 XR FOOT LIMITED BILATERAL      2. Contusion of right foot, initial encounter  S90.31XA       3. Sprain of calcaneofibular ligament of left ankle, initial encounter  S93.412A          Report: AP, lateral, oblique x-ray of the bilateral feet and ankles demonstrates no definite fracture    Impression: No definite fracture   IFEOMA BROOKS III, MD           Assessment:   Right foot contusion, left lateral ankle sprain    Treatment Plan:   4 This is a chronic

## 2025-05-20 ENCOUNTER — TELEPHONE (OUTPATIENT)
Age: 65
End: 2025-05-20

## 2025-05-20 NOTE — TELEPHONE ENCOUNTER
Spoke to patient told him to reach out to PCP to get some labs drawn to see if it is Gout if so let them treat it. But if not we would want to see him back to see what is going on. Patient will send us a message once he has an appt set up with PCP and once completed we may need to see him back sooner than 06/26/2025 (6 wk follow up)

## 2025-05-20 NOTE — TELEPHONE ENCOUNTER
Patient called this morning and stated that he has finished medrol dosepak, he finished it yesterday. He stated that his big toe is swelling again and thinks it could be gout? Or arthritis? Patient would like a return call on what next steps are

## 2025-05-22 ENCOUNTER — RESULTS FOLLOW-UP (OUTPATIENT)
Dept: INTERNAL MEDICINE CLINIC | Facility: CLINIC | Age: 65
End: 2025-05-22

## 2025-05-22 ENCOUNTER — OFFICE VISIT (OUTPATIENT)
Dept: INTERNAL MEDICINE CLINIC | Facility: CLINIC | Age: 65
End: 2025-05-22
Payer: MEDICARE

## 2025-05-22 VITALS
SYSTOLIC BLOOD PRESSURE: 132 MMHG | BODY MASS INDEX: 28.8 KG/M2 | DIASTOLIC BLOOD PRESSURE: 81 MMHG | WEIGHT: 201.2 LBS | OXYGEN SATURATION: 98 % | HEIGHT: 70 IN | TEMPERATURE: 97.2 F | HEART RATE: 87 BPM | RESPIRATION RATE: 18 BRPM

## 2025-05-22 DIAGNOSIS — M79.674 PAIN IN TOES OF BOTH FEET: Primary | ICD-10-CM

## 2025-05-22 DIAGNOSIS — M79.674 PAIN IN TOES OF BOTH FEET: ICD-10-CM

## 2025-05-22 DIAGNOSIS — M79.675 PAIN IN TOES OF BOTH FEET: ICD-10-CM

## 2025-05-22 DIAGNOSIS — M10.9 ACUTE GOUT INVOLVING TOE, UNSPECIFIED CAUSE, UNSPECIFIED LATERALITY: Primary | ICD-10-CM

## 2025-05-22 DIAGNOSIS — M79.675 PAIN IN TOES OF BOTH FEET: Primary | ICD-10-CM

## 2025-05-22 LAB — URATE SERPL-MCNC: 8.3 MG/DL (ref 3.9–8.2)

## 2025-05-22 PROCEDURE — 1123F ACP DISCUSS/DSCN MKR DOCD: CPT | Performed by: NURSE PRACTITIONER

## 2025-05-22 PROCEDURE — 1159F MED LIST DOCD IN RCRD: CPT | Performed by: NURSE PRACTITIONER

## 2025-05-22 PROCEDURE — 1160F RVW MEDS BY RX/DR IN RCRD: CPT | Performed by: NURSE PRACTITIONER

## 2025-05-22 PROCEDURE — 99213 OFFICE O/P EST LOW 20 MIN: CPT | Performed by: NURSE PRACTITIONER

## 2025-05-22 RX ORDER — COLCHICINE 0.6 MG/1
0.6 TABLET ORAL DAILY PRN
Qty: 30 TABLET | Refills: 3 | Status: SHIPPED | OUTPATIENT
Start: 2025-05-22

## 2025-05-22 RX ORDER — MELOXICAM 15 MG/1
15 TABLET ORAL DAILY
COMMUNITY
Start: 2025-04-29

## 2025-05-22 RX ORDER — COLCHICINE 0.6 MG/1
0.6 TABLET ORAL DAILY
Qty: 30 TABLET | Refills: 3 | Status: CANCELLED | OUTPATIENT
Start: 2025-05-22

## 2025-05-22 NOTE — TELEPHONE ENCOUNTER
Called and discussed elevated uric acid level with patient.  Will call in colchicine.  Recent renal function stable.  He will take colchicine as needed and come back if his symptoms do not resolve.  Knows to call for new or concerning symptoms.

## 2025-05-22 NOTE — PROGRESS NOTES
5/22/2025 10:40 AM  Location:[facility] PHYSICIAN SERVICES  [facility] [place] MEDICINE  [place]  Patient #:  [id number]  YOB: 1960    Reason for Visit  Foot Pain Patient presents in the office today to have uric acid level checked for possible gout in feet.      History of Present Illness  The patient is a 65-year-old male who presents for toe pain.    He reports an incident on 04/26/2025, where he was weed eating the side of a bank and stepped in a hole, resulting in a rolled left ankle. He sought medical attention from a podiatrist, who diagnosed him with a fractured ankle and a high ankle sprain. The podiatrist placed him in a pressure boot extending to his knee and advised a follow-up visit at the end of May 2025 for another x-ray. The patient was informed that he would likely need to wear the boot for 6 to 8 weeks, depending on the healing process. During this period, he opted to use a knee scooter instead of crutches. In the first week of May 2025, while maneuvering the scooter, the back tire rolled over his right toe, causing discomfort after two days. He then consulted Dr. Villegas, who conducted x-rays and diagnosed him with a severe sprain on his left ankle and a deep contusion on his right toe. Currently, his primary concern is his toe, which is causing him significant discomfort. He reports that his left ankle has improved, but he continues to experience swelling in his toes. He also reports that his left toe is more painful than the right, and he is unable to move his right toe. He has been using an Ace wrap for support but did not apply it today due to his visit. He has been elevating his leg consistently but notes that the swelling does not always subside. He also mentions that he has a small spur, which becomes more prominent when his foot swells. He has been using an Ace wrap for support but did not apply it today due to his visit. He has been elevating his leg consistently but

## 2025-06-12 SDOH — ECONOMIC STABILITY: FOOD INSECURITY: WITHIN THE PAST 12 MONTHS, YOU WORRIED THAT YOUR FOOD WOULD RUN OUT BEFORE YOU GOT MONEY TO BUY MORE.: NEVER TRUE

## 2025-06-12 SDOH — ECONOMIC STABILITY: FOOD INSECURITY: WITHIN THE PAST 12 MONTHS, THE FOOD YOU BOUGHT JUST DIDN'T LAST AND YOU DIDN'T HAVE MONEY TO GET MORE.: NEVER TRUE

## 2025-06-12 SDOH — ECONOMIC STABILITY: INCOME INSECURITY: IN THE LAST 12 MONTHS, WAS THERE A TIME WHEN YOU WERE NOT ABLE TO PAY THE MORTGAGE OR RENT ON TIME?: NO

## 2025-06-12 SDOH — ECONOMIC STABILITY: TRANSPORTATION INSECURITY
IN THE PAST 12 MONTHS, HAS THE LACK OF TRANSPORTATION KEPT YOU FROM MEDICAL APPOINTMENTS OR FROM GETTING MEDICATIONS?: NO

## 2025-06-12 ASSESSMENT — PATIENT HEALTH QUESTIONNAIRE - PHQ9
2. FEELING DOWN, DEPRESSED OR HOPELESS: NOT AT ALL
2. FEELING DOWN, DEPRESSED OR HOPELESS: NOT AT ALL
1. LITTLE INTEREST OR PLEASURE IN DOING THINGS: NOT AT ALL
SUM OF ALL RESPONSES TO PHQ QUESTIONS 1-9: 0
SUM OF ALL RESPONSES TO PHQ9 QUESTIONS 1 & 2: 0
SUM OF ALL RESPONSES TO PHQ QUESTIONS 1-9: 0
1. LITTLE INTEREST OR PLEASURE IN DOING THINGS: NOT AT ALL
SUM OF ALL RESPONSES TO PHQ QUESTIONS 1-9: 0
SUM OF ALL RESPONSES TO PHQ QUESTIONS 1-9: 0

## 2025-06-16 ENCOUNTER — TELEPHONE (OUTPATIENT)
Dept: INTERNAL MEDICINE CLINIC | Facility: CLINIC | Age: 65
End: 2025-06-16

## 2025-06-16 ENCOUNTER — OFFICE VISIT (OUTPATIENT)
Dept: INTERNAL MEDICINE CLINIC | Facility: CLINIC | Age: 65
End: 2025-06-16
Payer: MEDICARE

## 2025-06-16 VITALS
WEIGHT: 202.6 LBS | TEMPERATURE: 97.2 F | HEIGHT: 70 IN | OXYGEN SATURATION: 97 % | RESPIRATION RATE: 18 BRPM | SYSTOLIC BLOOD PRESSURE: 152 MMHG | BODY MASS INDEX: 29.01 KG/M2 | DIASTOLIC BLOOD PRESSURE: 80 MMHG | HEART RATE: 60 BPM

## 2025-06-16 DIAGNOSIS — M54.50 ACUTE LEFT-SIDED LOW BACK PAIN WITHOUT SCIATICA: ICD-10-CM

## 2025-06-16 DIAGNOSIS — R03.0 ELEVATED BLOOD PRESSURE READING IN OFFICE WITHOUT DIAGNOSIS OF HYPERTENSION: Primary | ICD-10-CM

## 2025-06-16 DIAGNOSIS — Z87.39 HISTORY OF GOUT: ICD-10-CM

## 2025-06-16 PROCEDURE — 1159F MED LIST DOCD IN RCRD: CPT | Performed by: NURSE PRACTITIONER

## 2025-06-16 PROCEDURE — 99214 OFFICE O/P EST MOD 30 MIN: CPT | Performed by: NURSE PRACTITIONER

## 2025-06-16 PROCEDURE — 1123F ACP DISCUSS/DSCN MKR DOCD: CPT | Performed by: NURSE PRACTITIONER

## 2025-06-16 PROCEDURE — 1160F RVW MEDS BY RX/DR IN RCRD: CPT | Performed by: NURSE PRACTITIONER

## 2025-06-16 RX ORDER — METHYLPREDNISOLONE 4 MG/1
TABLET ORAL
Qty: 1 KIT | Refills: 0 | Status: SHIPPED | OUTPATIENT
Start: 2025-06-16 | End: 2025-06-22

## 2025-06-16 ASSESSMENT — ENCOUNTER SYMPTOMS
SHORTNESS OF BREATH: 0
BACK PAIN: 1
DIARRHEA: 0

## 2025-06-16 NOTE — PATIENT INSTRUCTIONS
Patient Education      Begin beet root supplement  Low Back Pain: Exercises  Introduction  Here are some examples of exercises for you to try. The exercises may be suggested for a condition or for rehabilitation. Start each exercise slowly. Ease off the exercises if you start to have pain.  You will be told when to start these exercises and which ones will work best for you.  How to do the exercises  Hamstring stretch in a doorway    Sit on the floor close to a doorway. Be sure to stretch your affected leg first.  Lie down with your other leg through the doorway.  Slide your affected leg up the wall to straighten your knee. Don't point your toes. You should feel a gentle stretch down the back of your leg. Be sure to:  Hold the stretch for at least 1 minute. Then over time, try to lengthen the time you hold the stretch to as long as 6 minutes.  Repeat 2 to 4 times.  It's a good idea to repeat these steps with your other leg.  To stretch your right leg, scoot to the right side of the doorway.  To stretch your left leg, scoot to the left side of the doorway.  Keep both knees straight.  Keep your back flat and your other heel on the floor.  If you do not have a place to do this exercise in a doorway, there is another way to do it:  Lie on your back, and bend the knee of your affected leg.  Loop a towel under the ball and toes of that foot, and hold the ends of the towel in your hands.  Straighten your knee as you raise that foot into the air. Slowly pull back on the towel. You should feel a gentle stretch down the back of your leg.  Hold the stretch for 15 to 30 seconds. Or even better, hold the stretch for 1 minute if you can.  Repeat 2 to 4 times.  It's a good idea to repeat these steps with your other leg.  Single knee-to-chest stretch    Lie on your back with your knees bent and your feet flat on the floor. You can put a small pillow under your head and neck if it is more comfortable.  Clasp your hands under one

## 2025-06-16 NOTE — PROGRESS NOTES
blood pressure reading in office without diagnosis of hypertension      2. History of gout      3. Acute left-sided low back pain without sciatica  - methylPREDNISolone (MEDROL DOSEPACK) 4 MG tablet; Take by mouth.  Dispense: 1 kit; Refill: 0  - XR LUMBAR SPINE (MIN 4 VIEWS); Future  - XR HIP 2-3 VW W PELVIS LEFT; Future        Total time for encounter on day of encounter was 35 minutes.  This time includes chart prep, review of tests/procedures, review of other provider's notes, documentation and counseling patient regarding disease process and medications.           Neetu Valenzuela, APRN - CNP

## 2025-06-17 DIAGNOSIS — Z80.42 FAMILY HISTORY OF PROSTATE CANCER: Primary | ICD-10-CM

## 2025-06-17 DIAGNOSIS — R39.12 POOR URINARY STREAM: ICD-10-CM

## 2025-09-04 ENCOUNTER — TELEPHONE (OUTPATIENT)
Dept: INTERNAL MEDICINE CLINIC | Facility: CLINIC | Age: 65
End: 2025-09-04